# Patient Record
Sex: FEMALE | Race: WHITE | Employment: UNEMPLOYED | URBAN - METROPOLITAN AREA
[De-identification: names, ages, dates, MRNs, and addresses within clinical notes are randomized per-mention and may not be internally consistent; named-entity substitution may affect disease eponyms.]

---

## 2023-08-14 ENCOUNTER — HOSPITAL ENCOUNTER (EMERGENCY)
Age: 28
Discharge: HOME | End: 2023-08-14
Attending: INTERNAL MEDICINE
Payer: COMMERCIAL

## 2023-08-14 ENCOUNTER — APPOINTMENT (OUTPATIENT)
Dept: CT IMAGING | Age: 28
End: 2023-08-14
Payer: COMMERCIAL

## 2023-08-14 VITALS
HEIGHT: 64 IN | BODY MASS INDEX: 43.71 KG/M2 | HEART RATE: 73 BPM | WEIGHT: 256 LBS | OXYGEN SATURATION: 98 % | RESPIRATION RATE: 26 BRPM | TEMPERATURE: 97.7 F | DIASTOLIC BLOOD PRESSURE: 87 MMHG | SYSTOLIC BLOOD PRESSURE: 142 MMHG

## 2023-08-14 DIAGNOSIS — R10.13 ABDOMINAL PAIN, EPIGASTRIC: Primary | ICD-10-CM

## 2023-08-14 LAB
ALBUMIN SERPL-MCNC: 5 G/DL (ref 3.4–5)
ALP SERPL-CCNC: 158 U/L (ref 40–129)
ALT SERPL-CCNC: 30 U/L (ref 10–40)
ANION GAP SERPL CALCULATED.3IONS-SCNC: 11 MMOL/L (ref 3–16)
AST SERPL-CCNC: 16 U/L (ref 15–37)
BACTERIA URNS QL MICRO: ABNORMAL /HPF
BASOPHILS # BLD: 0.1 K/UL (ref 0–0.2)
BASOPHILS NFR BLD: 0.8 %
BILIRUB DIRECT SERPL-MCNC: <0.2 MG/DL (ref 0–0.3)
BILIRUB INDIRECT SERPL-MCNC: ABNORMAL MG/DL (ref 0–1)
BILIRUB SERPL-MCNC: <0.2 MG/DL (ref 0–1)
BILIRUB UR QL STRIP.AUTO: NEGATIVE
BUN SERPL-MCNC: 19 MG/DL (ref 7–20)
CALCIUM SERPL-MCNC: 10.7 MG/DL (ref 8.3–10.6)
CHLORIDE SERPL-SCNC: 108 MMOL/L (ref 99–110)
CLARITY UR: CLEAR
CO2 SERPL-SCNC: 24 MMOL/L (ref 21–32)
COLOR UR: YELLOW
CREAT SERPL-MCNC: 0.7 MG/DL (ref 0.6–1.1)
DEPRECATED RDW RBC AUTO: 15.4 % (ref 12.4–15.4)
EOSINOPHIL # BLD: 0.1 K/UL (ref 0–0.6)
EOSINOPHIL NFR BLD: 1.2 %
EPI CELLS #/AREA URNS AUTO: 5 /HPF (ref 0–5)
GFR SERPLBLD CREATININE-BSD FMLA CKD-EPI: >60 ML/MIN/{1.73_M2}
GLUCOSE SERPL-MCNC: 125 MG/DL (ref 70–99)
GLUCOSE UR STRIP.AUTO-MCNC: NEGATIVE MG/DL
HCG UR QL: NEGATIVE
HCT VFR BLD AUTO: 36.9 % (ref 36–48)
HGB BLD-MCNC: 11.9 G/DL (ref 12–16)
HGB UR QL STRIP.AUTO: NEGATIVE
HYALINE CASTS #/AREA URNS AUTO: 0 /LPF (ref 0–8)
KETONES UR STRIP.AUTO-MCNC: NEGATIVE MG/DL
LEUKOCYTE ESTERASE UR QL STRIP.AUTO: ABNORMAL
LIPASE SERPL-CCNC: 28 U/L (ref 13–60)
LYMPHOCYTES # BLD: 1.5 K/UL (ref 1–5.1)
LYMPHOCYTES NFR BLD: 15.3 %
MCH RBC QN AUTO: 23.6 PG (ref 26–34)
MCHC RBC AUTO-ENTMCNC: 32.3 G/DL (ref 31–36)
MCV RBC AUTO: 73.1 FL (ref 80–100)
MONOCYTES # BLD: 0.5 K/UL (ref 0–1.3)
MONOCYTES NFR BLD: 5.2 %
NEUTROPHILS # BLD: 7.5 K/UL (ref 1.7–7.7)
NEUTROPHILS NFR BLD: 77.5 %
NITRITE UR QL STRIP.AUTO: NEGATIVE
PH UR STRIP.AUTO: 6.5 [PH] (ref 5–8)
PLATELET # BLD AUTO: 401 K/UL (ref 135–450)
PMV BLD AUTO: 7.9 FL (ref 5–10.5)
POTASSIUM SERPL-SCNC: 4.4 MMOL/L (ref 3.5–5.1)
PROT SERPL-MCNC: 8 G/DL (ref 6.4–8.2)
PROT UR STRIP.AUTO-MCNC: NEGATIVE MG/DL
RBC # BLD AUTO: 5.04 M/UL (ref 4–5.2)
RBC CLUMPS #/AREA URNS AUTO: 1 /HPF (ref 0–4)
SODIUM SERPL-SCNC: 143 MMOL/L (ref 136–145)
SP GR UR STRIP.AUTO: 1.02 (ref 1–1.03)
UA COMPLETE W REFLEX CULTURE PNL UR: ABNORMAL
UA DIPSTICK W REFLEX MICRO PNL UR: YES
URN SPEC COLLECT METH UR: ABNORMAL
UROBILINOGEN UR STRIP-ACNC: 0.2 E.U./DL
WBC # BLD AUTO: 9.7 K/UL (ref 4–11)
WBC #/AREA URNS AUTO: 1 /HPF (ref 0–5)

## 2023-08-14 PROCEDURE — 81001 URINALYSIS AUTO W/SCOPE: CPT

## 2023-08-14 PROCEDURE — 6360000004 HC RX CONTRAST MEDICATION: Performed by: STUDENT IN AN ORGANIZED HEALTH CARE EDUCATION/TRAINING PROGRAM

## 2023-08-14 PROCEDURE — 36415 COLL VENOUS BLD VENIPUNCTURE: CPT

## 2023-08-14 PROCEDURE — 85025 COMPLETE CBC W/AUTO DIFF WBC: CPT

## 2023-08-14 PROCEDURE — 84703 CHORIONIC GONADOTROPIN ASSAY: CPT

## 2023-08-14 PROCEDURE — 74177 CT ABD & PELVIS W/CONTRAST: CPT

## 2023-08-14 PROCEDURE — 99285 EMERGENCY DEPT VISIT HI MDM: CPT

## 2023-08-14 PROCEDURE — 83690 ASSAY OF LIPASE: CPT

## 2023-08-14 PROCEDURE — 6370000000 HC RX 637 (ALT 250 FOR IP): Performed by: INTERNAL MEDICINE

## 2023-08-14 PROCEDURE — 80048 BASIC METABOLIC PNL TOTAL CA: CPT

## 2023-08-14 PROCEDURE — 80076 HEPATIC FUNCTION PANEL: CPT

## 2023-08-14 PROCEDURE — 2580000003 HC RX 258: Performed by: INTERNAL MEDICINE

## 2023-08-14 RX ORDER — SIMETHICONE 40MG/0.6ML
10 SUSPENSION, DROPS(FINAL DOSAGE FORM)(ML) ORAL 2 TIMES DAILY
Qty: 200 ML | Refills: 0 | Status: SHIPPED | OUTPATIENT
Start: 2023-08-14

## 2023-08-14 RX ORDER — 0.9 % SODIUM CHLORIDE 0.9 %
500 INTRAVENOUS SOLUTION INTRAVENOUS ONCE
Status: COMPLETED | OUTPATIENT
Start: 2023-08-14 | End: 2023-08-14

## 2023-08-14 RX ADMIN — SODIUM CHLORIDE 500 ML: 9 INJECTION, SOLUTION INTRAVENOUS at 05:58

## 2023-08-14 RX ADMIN — ALUMINUM HYDROXIDE, MAGNESIUM HYDROXIDE, AND SIMETHICONE: 200; 200; 20 SUSPENSION ORAL at 05:57

## 2023-08-14 RX ADMIN — IOPAMIDOL 75 ML: 755 INJECTION, SOLUTION INTRAVENOUS at 06:41

## 2023-08-14 ASSESSMENT — PAIN DESCRIPTION - FREQUENCY: FREQUENCY: CONTINUOUS

## 2023-08-14 ASSESSMENT — PAIN DESCRIPTION - ORIENTATION: ORIENTATION: MID;UPPER

## 2023-08-14 ASSESSMENT — PAIN DESCRIPTION - LOCATION: LOCATION: ABDOMEN

## 2023-08-14 ASSESSMENT — PAIN - FUNCTIONAL ASSESSMENT: PAIN_FUNCTIONAL_ASSESSMENT: 0-10

## 2023-08-14 ASSESSMENT — LIFESTYLE VARIABLES
HOW MANY STANDARD DRINKS CONTAINING ALCOHOL DO YOU HAVE ON A TYPICAL DAY: PATIENT DOES NOT DRINK
HOW OFTEN DO YOU HAVE A DRINK CONTAINING ALCOHOL: NEVER

## 2023-08-14 ASSESSMENT — PAIN SCALES - GENERAL: PAINLEVEL_OUTOF10: 10

## 2023-08-14 ASSESSMENT — PAIN DESCRIPTION - PAIN TYPE: TYPE: ACUTE PAIN

## 2023-08-14 NOTE — ED PROVIDER NOTES
EMERGENCY MEDICINE PROVIDER NOTE    Patient Identification  Pt Name: Mary Ann Gallagher  MRN: [id number]  [address] 1995  Date of evaluation: 8/14/2023  Provider: Yahaira Smith DO  PCP: No primary care provider on file. Chief Complaint  Abdominal Pain (From home. C/O upper mid abdominal pain, vomiting since 2200. States she had blood work done recently which showed an elevated alk phos. PT diaphoretic in triage. 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 Tried omeprazole, cinnamon, emilee w/o relief. Currently breastfeeding.)      HPI  (History provided by patient)  This is a 33 y.o. female who was brought in by self for epigastric abdominal pain that started at 10 PM yesterday. Patient has a history of a liver tumor and recently had blood work that showed an elevated alkaline phosphatase per the family. Family states that she is scheduled to have an MRI done today to evaluate her abdomen but I do not see any order in care everywhere or in [facility] for an MRI. Patient denies nausea and vomiting. She tried omeprazole, cinnamon and emilee without relief. She is currently breast-feeding. The pain is severe. I have reviewed the following nursing documentation:  Allergies: Patient has no known allergies. Past medical history:   Past Medical History:   Diagnosis Date    Liver tumor      Past surgical history:   Past Surgical History:   Procedure Laterality Date    ANKLE SURGERY      KNEE SURGERY      LIVER RESECTION      SINUS SURGERY      TONSILLECTOMY         Home medications:   Discharge Medication List as of 8/14/2023  7:27 AM        CONTINUE these medications which have NOT CHANGED    Details   Prenatal Vit-Fe Fumarate-FA (PRENATAL VITAMINS PO) Take by mouthHistorical Med      Citalopram Hydrobromide (CELEXA PO) Take by mouthHistorical Med             Social history:  reports that she has never smoked. She has never used smokeless tobacco. She reports that she does not currently use alcohol.  She

## 2023-08-14 NOTE — ED PROVIDER NOTES
Emergency Department Encounter  Location: [address] [place] [address]    Patient: Nathalia Mcgraw  MRN: [id number]  : 1995  Date of evaluation: 2023  ED Provider: Alia Rider MD    Nathalia Mcgraw was checked out to me by Dr. Iker Myers. Please see his/her initial documentation for details of the patient's initial ED presentation, physical exam and completed studies. In brief, Nathalia Mcgraw is a 33 y.o. female that presented to the emergency department with emesis and upper abd pain. She is breastfeeding. Hx of hepatic tumor surgically removed. Omeprazole not working.      I have reviewed and interpreted all of the currently available lab results and diagnostics from this visit:    Labs  Results for orders placed or performed during the hospital encounter of 23   CBC with Auto Differential   Result Value Ref Range    WBC 9.7 4.0 - 11.0 K/uL    RBC 5.04 4.00 - 5.20 M/uL    Hemoglobin 11.9 (L) 12.0 - 16.0 g/dL    Hematocrit 36.9 36.0 - 48.0 %    MCV 73.1 (L) 80.0 - 100.0 fL    MCH 23.6 (L) 26.0 - 34.0 pg    MCHC 32.3 31.0 - 36.0 g/dL    RDW 15.4 12.4 - 15.4 %    Platelets 405 076 - 755 K/uL    MPV 7.9 5.0 - 10.5 fL    Neutrophils % 77.5 %    Lymphocytes % 15.3 %    Monocytes % 5.2 %    Eosinophils % 1.2 %    Basophils % 0.8 %    Neutrophils Absolute 7.5 1.7 - 7.7 K/uL    Lymphocytes Absolute 1.5 1.0 - 5.1 K/uL    Monocytes Absolute 0.5 0.0 - 1.3 K/uL    Eosinophils Absolute 0.1 0.0 - 0.6 K/uL    Basophils Absolute 0.1 0.0 - 0.2 K/uL   Basic Metabolic Panel   Result Value Ref Range    Sodium 143 136 - 145 mmol/L    Potassium 4.4 3.5 - 5.1 mmol/L    Chloride 108 99 - 110 mmol/L    CO2 24 21 - 32 mmol/L    Anion Gap 11 3 - 16    Glucose 125 (H) 70 - 99 mg/dL    BUN 19 7 - 20 mg/dL    Creatinine 0.7 0.6 - 1.1 mg/dL    Est, Glom Filt Rate >60 >60    Calcium 10.7 (H) 8.3 - 10.6 mg/dL   Hepatic Function Panel   Result Value Ref Range    Total Protein 8.0 6.4 -

## 2023-09-04 ENCOUNTER — HOSPITAL ENCOUNTER (EMERGENCY)
Age: 28
Discharge: HOME | End: 2023-09-04
Payer: COMMERCIAL

## 2023-09-04 VITALS
SYSTOLIC BLOOD PRESSURE: 120 MMHG | RESPIRATION RATE: 16 BRPM | OXYGEN SATURATION: 97 % | DIASTOLIC BLOOD PRESSURE: 71 MMHG | BODY MASS INDEX: 43.07 KG/M2 | WEIGHT: 252.3 LBS | TEMPERATURE: 98.2 F | HEART RATE: 82 BPM | HEIGHT: 64 IN

## 2023-09-04 DIAGNOSIS — R10.13 ABDOMINAL PAIN, EPIGASTRIC: Primary | ICD-10-CM

## 2023-09-04 LAB
ALBUMIN SERPL-MCNC: 4.2 G/DL (ref 3.4–5)
ALBUMIN/GLOB SERPL: 1.2 {RATIO} (ref 1.1–2.2)
ALP SERPL-CCNC: 139 U/L (ref 40–129)
ALT SERPL-CCNC: 35 U/L (ref 10–40)
ANION GAP SERPL CALCULATED.3IONS-SCNC: 13 MMOL/L (ref 3–16)
AST SERPL-CCNC: 22 U/L (ref 15–37)
BASOPHILS # BLD: 0.1 K/UL (ref 0–0.2)
BASOPHILS NFR BLD: 1.3 %
BILIRUB SERPL-MCNC: <0.2 MG/DL (ref 0–1)
BUN SERPL-MCNC: 18 MG/DL (ref 7–20)
CALCIUM SERPL-MCNC: 10.9 MG/DL (ref 8.3–10.6)
CHLORIDE SERPL-SCNC: 106 MMOL/L (ref 99–110)
CO2 SERPL-SCNC: 20 MMOL/L (ref 21–32)
CREAT SERPL-MCNC: <0.5 MG/DL (ref 0.6–1.1)
DEPRECATED RDW RBC AUTO: 15.8 % (ref 12.4–15.4)
EOSINOPHIL # BLD: 0.1 K/UL (ref 0–0.6)
EOSINOPHIL NFR BLD: 1.5 %
GFR SERPLBLD CREATININE-BSD FMLA CKD-EPI: >60 ML/MIN/{1.73_M2}
GLUCOSE SERPL-MCNC: 118 MG/DL (ref 70–99)
HCG SERPL QL: NEGATIVE
HCT VFR BLD AUTO: 37 % (ref 36–48)
HGB BLD-MCNC: 12.2 G/DL (ref 12–16)
LIPASE SERPL-CCNC: 26 U/L (ref 13–60)
LYMPHOCYTES # BLD: 2.4 K/UL (ref 1–5.1)
LYMPHOCYTES NFR BLD: 30.8 %
MCH RBC QN AUTO: 23.6 PG (ref 26–34)
MCHC RBC AUTO-ENTMCNC: 32.9 G/DL (ref 31–36)
MCV RBC AUTO: 71.9 FL (ref 80–100)
MONOCYTES # BLD: 0.6 K/UL (ref 0–1.3)
MONOCYTES NFR BLD: 7.9 %
NEUTROPHILS # BLD: 4.6 K/UL (ref 1.7–7.7)
NEUTROPHILS NFR BLD: 58.5 %
PLATELET # BLD AUTO: 382 K/UL (ref 135–450)
PMV BLD AUTO: 7.9 FL (ref 5–10.5)
POTASSIUM SERPL-SCNC: 4.3 MMOL/L (ref 3.5–5.1)
PROT SERPL-MCNC: 7.7 G/DL (ref 6.4–8.2)
RBC # BLD AUTO: 5.15 M/UL (ref 4–5.2)
SODIUM SERPL-SCNC: 139 MMOL/L (ref 136–145)
WBC # BLD AUTO: 7.8 K/UL (ref 4–11)

## 2023-09-04 PROCEDURE — 99283 EMERGENCY DEPT VISIT LOW MDM: CPT

## 2023-09-04 PROCEDURE — 83690 ASSAY OF LIPASE: CPT

## 2023-09-04 PROCEDURE — 80053 COMPREHEN METABOLIC PANEL: CPT

## 2023-09-04 PROCEDURE — 6370000000 HC RX 637 (ALT 250 FOR IP): Performed by: NURSE PRACTITIONER

## 2023-09-04 PROCEDURE — 85025 COMPLETE CBC W/AUTO DIFF WBC: CPT

## 2023-09-04 PROCEDURE — 84703 CHORIONIC GONADOTROPIN ASSAY: CPT

## 2023-09-04 RX ORDER — OMEPRAZOLE 20 MG/1
20 CAPSULE, DELAYED RELEASE ORAL
Qty: 30 CAPSULE | Refills: 0 | Status: SHIPPED | OUTPATIENT
Start: 2023-09-04

## 2023-09-04 RX ADMIN — ALUMINUM HYDROXIDE, MAGNESIUM HYDROXIDE, AND SIMETHICONE: 200; 200; 20 SUSPENSION ORAL at 02:24

## 2023-09-04 ASSESSMENT — ENCOUNTER SYMPTOMS
DIARRHEA: 0
ABDOMINAL PAIN: 1
VOMITING: 1
NAUSEA: 1
SHORTNESS OF BREATH: 0
CHEST TIGHTNESS: 0

## 2023-09-04 ASSESSMENT — PAIN - FUNCTIONAL ASSESSMENT: PAIN_FUNCTIONAL_ASSESSMENT: 0-10

## 2023-09-04 ASSESSMENT — PAIN DESCRIPTION - PAIN TYPE: TYPE: ACUTE PAIN

## 2023-09-04 ASSESSMENT — PAIN DESCRIPTION - LOCATION: LOCATION: ABDOMEN

## 2023-09-04 ASSESSMENT — PAIN DESCRIPTION - ORIENTATION: ORIENTATION: UPPER;MID

## 2023-09-04 ASSESSMENT — PAIN SCALES - GENERAL: PAINLEVEL_OUTOF10: 8

## 2023-09-04 NOTE — ED PROVIDER NOTES
acute pancreatitis, intra-abdominal abscess, perforated viscus, diverticulitis, cholecystitis, appendicitis, PID, ovarian torsion, ectopic pregnancy and tubo-ovarian abscess is very low. There is no evidence of peritonitis, sepsis or toxicity at this time. I feel the patient can be managed as an outpatient with follow-up with her family doctor in 24-48 hours. Instructions have been given for the patient to return to the ED for worsening of the pain, high fevers, intractable vomiting, or bleeding. Disposition Considerations (include 1 Tests not done, Shared Decision Making, Pt Expectation of Test or Tx.): Shared decision making used throughout. I did consider CT scan, the patient did have a normal CT scan 2 weeks ago and symptoms are present similarly    Appropriate for outpatient management follow-up with primary care      I am the Primary Clinician of Record. FINAL IMPRESSION      1.  Abdominal pain, epigastric          DISPOSITION/PLAN     DISPOSITION Decision To Discharge 09/04/2023 02:26:22 AM      PATIENT REFERRED TO:  Mine Duran, [address] [facility]  [phone]    Schedule an appointment as soon as possible for a visit       Jake Kumar, [address] [facility], [address] [place] [phone]    Schedule an appointment as soon as possible for a visit   gi specialist- if pain continues, please schedule an appointment      DISCHARGE MEDICATIONS:  Discharge Medication List as of 9/4/2023  3:03 AM        START taking these medications    Details   omeprazole (PRILOSEC) 20 MG delayed release capsule Take 1 capsule by mouth every morning (before breakfast), Disp-30 capsule, R-0Normal             DISCONTINUED MEDICATIONS:  Discharge Medication List as of 9/4/2023  3:03 AM                 (Please note that portions of this note were completed with a voice recognition program.  Efforts were made to edit the dictations but occasionally words are

## 2023-12-04 ENCOUNTER — HOSPITAL ENCOUNTER (EMERGENCY)
Age: 28
Discharge: LEFT BEFORE DISCHARGE | End: 2023-12-04
Payer: COMMERCIAL

## 2023-12-04 ENCOUNTER — APPOINTMENT (OUTPATIENT)
Dept: CT IMAGING | Age: 28
End: 2023-12-04
Payer: COMMERCIAL

## 2023-12-04 VITALS
TEMPERATURE: 98.2 F | WEIGHT: 255 LBS | HEIGHT: 64 IN | RESPIRATION RATE: 20 BRPM | HEART RATE: 86 BPM | DIASTOLIC BLOOD PRESSURE: 83 MMHG | BODY MASS INDEX: 43.54 KG/M2 | OXYGEN SATURATION: 96 % | SYSTOLIC BLOOD PRESSURE: 129 MMHG

## 2023-12-04 DIAGNOSIS — R10.13 ABDOMINAL PAIN, EPIGASTRIC: Primary | ICD-10-CM

## 2023-12-04 LAB
ALBUMIN SERPL-MCNC: 4.9 G/DL (ref 3.4–5)
ALBUMIN/GLOB SERPL: 2.1 {RATIO} (ref 1.1–2.2)
ALP SERPL-CCNC: 142 U/L (ref 40–129)
ALT SERPL-CCNC: 17 U/L (ref 10–40)
ANION GAP SERPL CALCULATED.3IONS-SCNC: 9 MMOL/L (ref 3–16)
AST SERPL-CCNC: 12 U/L (ref 15–37)
BACTERIA URNS QL MICRO: ABNORMAL /HPF
BASOPHILS # BLD: 0 K/UL (ref 0–0.2)
BASOPHILS NFR BLD: 0.5 %
BILIRUB SERPL-MCNC: 0.5 MG/DL (ref 0–1)
BILIRUB UR QL STRIP.AUTO: NEGATIVE
BUN SERPL-MCNC: 9 MG/DL (ref 7–20)
CALCIUM SERPL-MCNC: 9.7 MG/DL (ref 8.3–10.6)
CHLORIDE SERPL-SCNC: 104 MMOL/L (ref 99–110)
CLARITY UR: ABNORMAL
CO2 SERPL-SCNC: 27 MMOL/L (ref 21–32)
COLOR UR: ABNORMAL
CREAT SERPL-MCNC: 0.7 MG/DL (ref 0.6–1.1)
DEPRECATED RDW RBC AUTO: 17.3 % (ref 12.4–15.4)
EOSINOPHIL # BLD: 0.2 K/UL (ref 0–0.6)
EOSINOPHIL NFR BLD: 2.9 %
EPI CELLS #/AREA URNS AUTO: 26 /HPF (ref 0–5)
GFR SERPLBLD CREATININE-BSD FMLA CKD-EPI: >60 ML/MIN/{1.73_M2}
GLUCOSE SERPL-MCNC: 94 MG/DL (ref 70–99)
GLUCOSE UR STRIP.AUTO-MCNC: NEGATIVE MG/DL
HCG SERPL QL: NEGATIVE
HCT VFR BLD AUTO: 40.7 % (ref 36–48)
HGB BLD-MCNC: 13.5 G/DL (ref 12–16)
HGB UR QL STRIP.AUTO: NEGATIVE
HYALINE CASTS #/AREA URNS AUTO: 0 /LPF (ref 0–8)
KETONES UR STRIP.AUTO-MCNC: NEGATIVE MG/DL
LEUKOCYTE ESTERASE UR QL STRIP.AUTO: NEGATIVE
LIPASE SERPL-CCNC: 29 U/L (ref 13–60)
LYMPHOCYTES # BLD: 1.5 K/UL (ref 1–5.1)
LYMPHOCYTES NFR BLD: 19 %
MCH RBC QN AUTO: 26.5 PG (ref 26–34)
MCHC RBC AUTO-ENTMCNC: 33.3 G/DL (ref 31–36)
MCV RBC AUTO: 79.6 FL (ref 80–100)
MONOCYTES # BLD: 0.5 K/UL (ref 0–1.3)
MONOCYTES NFR BLD: 5.9 %
NEUTROPHILS # BLD: 5.8 K/UL (ref 1.7–7.7)
NEUTROPHILS NFR BLD: 71.7 %
NITRITE UR QL STRIP.AUTO: NEGATIVE
PH UR STRIP.AUTO: 6 [PH] (ref 5–8)
PLATELET # BLD AUTO: 358 K/UL (ref 135–450)
PMV BLD AUTO: 7.6 FL (ref 5–10.5)
POTASSIUM SERPL-SCNC: 3.9 MMOL/L (ref 3.5–5.1)
PROT SERPL-MCNC: 7.2 G/DL (ref 6.4–8.2)
PROT UR STRIP.AUTO-MCNC: ABNORMAL MG/DL
RBC # BLD AUTO: 5.12 M/UL (ref 4–5.2)
RBC CLUMPS #/AREA URNS AUTO: 3 /HPF (ref 0–4)
SODIUM SERPL-SCNC: 140 MMOL/L (ref 136–145)
SP GR UR STRIP.AUTO: 1.02 (ref 1–1.03)
UA COMPLETE W REFLEX CULTURE PNL UR: ABNORMAL
UA DIPSTICK W REFLEX MICRO PNL UR: YES
URN SPEC COLLECT METH UR: ABNORMAL
UROBILINOGEN UR STRIP-ACNC: 1 E.U./DL
WBC # BLD AUTO: 8.2 K/UL (ref 4–11)
WBC #/AREA URNS AUTO: 4 /HPF (ref 0–5)

## 2023-12-04 PROCEDURE — 85025 COMPLETE CBC W/AUTO DIFF WBC: CPT

## 2023-12-04 PROCEDURE — 99283 EMERGENCY DEPT VISIT LOW MDM: CPT

## 2023-12-04 PROCEDURE — 80053 COMPREHEN METABOLIC PANEL: CPT

## 2023-12-04 PROCEDURE — 84703 CHORIONIC GONADOTROPIN ASSAY: CPT

## 2023-12-04 PROCEDURE — 81001 URINALYSIS AUTO W/SCOPE: CPT

## 2023-12-04 PROCEDURE — 83690 ASSAY OF LIPASE: CPT

## 2023-12-04 RX ORDER — BUSPIRONE HYDROCHLORIDE 10 MG/1
10 TABLET ORAL 2 TIMES DAILY
COMMUNITY

## 2023-12-04 RX ORDER — FERROUS SULFATE 325(65) MG
325 TABLET ORAL EVERY OTHER DAY
COMMUNITY

## 2023-12-04 ASSESSMENT — PAIN DESCRIPTION - PAIN TYPE: TYPE: ACUTE PAIN

## 2023-12-04 ASSESSMENT — ENCOUNTER SYMPTOMS
SHORTNESS OF BREATH: 0
DIARRHEA: 1
COLOR CHANGE: 0
WHEEZING: 0
CONSTIPATION: 0
STRIDOR: 0
BACK PAIN: 0
NAUSEA: 1
COUGH: 0
VOMITING: 1
ABDOMINAL PAIN: 1

## 2023-12-04 ASSESSMENT — PAIN DESCRIPTION - LOCATION: LOCATION: ABDOMEN

## 2023-12-04 ASSESSMENT — PAIN DESCRIPTION - ORIENTATION: ORIENTATION: RIGHT;MID

## 2023-12-04 ASSESSMENT — LIFESTYLE VARIABLES: HOW OFTEN DO YOU HAVE A DRINK CONTAINING ALCOHOL: MONTHLY OR LESS

## 2023-12-04 ASSESSMENT — PAIN - FUNCTIONAL ASSESSMENT: PAIN_FUNCTIONAL_ASSESSMENT: 0-10

## 2023-12-04 NOTE — ED TRIAGE NOTES
[facility]        Pt Name: Jere Villarreal  MRN: [id number]  [address] 1995  Date of evaluation: 12/4/2023  Provider: Bruno Richardson PA-C  PCP: Harley Burden MD  Note Started: 11:50 AM EST 12/4/23      {Shared Not Shared:59920}      [address]       Chief Complaint   Patient presents with    Abdominal Pain     Arrived per self d/t abd pain, emesis and diarrhea that started yesterday; woke up this morning with a rash to face; abnormal labs drawn on Friday with elevated B12 and thyroid       HISTORY OF PRESENT ILLNESS: 1 or more Elements     {History from (Optional):02704}    {Limitations to history (Optional):95837}    Jere Villarreal is a 33 y.o. female who presents ***    Nursing Notes were all reviewed and agreed with or any disagreements were addressed in the HPI. REVIEW OF SYSTEMS :      Review of Systems    Positives and Pertinent negatives as per HPI. SURGICAL HISTORY     Past Surgical History:   Procedure Laterality Date    ANKLE SURGERY      KNEE SURGERY      LIVER RESECTION      SINUS SURGERY      TONSILLECTOMY         CURRENTMEDICATIONS       Previous Medications    BUSPIRONE (BUSPAR) 10 MG TABLET    Take 1 tablet by mouth in the morning and at bedtime    RONI CARB-MAG HYDROX-SIMETH (MYLANTA COAT & COOL) 1200-270-80 MG/10ML SUSP    Take 10 mLs by mouth in the morning and at bedtime    CITALOPRAM HYDROBROMIDE (CELEXA PO)    Take by mouth    FERROUS SULFATE (IRON 325) 325 (65 FE) MG TABLET    Take 1 tablet by mouth every other day    OMEPRAZOLE (PRILOSEC) 20 MG DELAYED RELEASE CAPSULE    Take 1 capsule by mouth every morning (before breakfast)    PRENATAL VIT-FE FUMARATE-FA (PRENATAL VITAMINS PO)    Take by mouth       ALLERGIES     Patient has no known allergies. FAMILYHISTORY     History reviewed. No pertinent family history.      SOCIAL HISTORY       Social History     Tobacco Use

## 2023-12-04 NOTE — ED PROVIDER NOTES
[facility]        Pt Name: Curtis Weaver  MRN: [id number]  [address] 1995  Date of evaluation: 2023  Provider: Tamiko Barnhart PA-C  PCP: Hellen Paredes MD  Note Started: 12:10 PM EST 23      VENECIA. I have evaluated this patient. CHIEF COMPLAINT       Chief Complaint   Patient presents with    Abdominal Pain     Arrived per self d/t abd pain, emesis and diarrhea that started yesterday; woke up this morning with a rash to face; abnormal labs drawn on Friday with elevated B12 and thyroid       HISTORY OF PRESENT ILLNESS: 1 or more Elements     History from : Patient    Limitations to history : None    Curtis Weaver is a 33 y.o. female who presents to the emergency department complaining of upper abdominal pain with nausea, vomiting and diarrhea starting yesterday. She denies ingestion of seafood,  or spicy food prior to onset of symptoms. She denies recent travel, sick exposures or antibiotics. She has had issues with epigastric pain and GERD in the past.  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 This morning she woke up with acne on her right cheek. She had labs drawn on Friday and was told that her B12 and thyroid levels were elevated. However, she recently started taking weekly injections of B12 8 weeks ago because her B12 levels were low at that time. Nursing Notes were all reviewed and agreed with or any disagreements were addressed in the HPI. REVIEW OF SYSTEMS :      Review of Systems   Constitutional:  Negative for chills and fever. HENT: Negative. Eyes:  Negative for visual disturbance. Respiratory:  Negative for cough, shortness of breath, wheezing and stridor. Cardiovascular:  Negative for chest pain, palpitations and leg swelling.    Gastrointestinal:  Positive for abdominal pain,

## 2024-01-01 ASSESSMENT — ENCOUNTER SYMPTOMS
SHORTNESS OF BREATH: 0
VOMITING: 1
NAUSEA: 1
ABDOMINAL PAIN: 1
COLOR CHANGE: 0
CONSTIPATION: 0
BACK PAIN: 0
BLOOD IN STOOL: 0
DIARRHEA: 1

## 2024-10-14 ENCOUNTER — HOSPITAL ENCOUNTER (OUTPATIENT)
Age: 29
Discharge: HOME | End: 2024-10-14
Payer: COMMERCIAL

## 2024-10-14 ENCOUNTER — HOSPITAL ENCOUNTER (OUTPATIENT)
Dept: GENERAL RADIOLOGY | Age: 29
Discharge: HOME | End: 2024-10-14
Payer: COMMERCIAL

## 2024-10-14 DIAGNOSIS — M47.816 LUMBAR SPONDYLOSIS: ICD-10-CM

## 2024-10-14 PROCEDURE — 72110 X-RAY EXAM L-2 SPINE 4/>VWS: CPT

## 2024-12-26 ENCOUNTER — HOSPITAL ENCOUNTER (OUTPATIENT)
Age: 29
Discharge: HOME | End: 2024-12-26
Payer: COMMERCIAL

## 2024-12-26 DIAGNOSIS — M54.16 RADICULOPATHY, LUMBAR REGION: ICD-10-CM

## 2024-12-26 PROCEDURE — 72148 MRI LUMBAR SPINE W/O DYE: CPT

## 2025-02-11 ENCOUNTER — HOSPITAL ENCOUNTER (EMERGENCY)
Age: 30
Discharge: HOME | End: 2025-02-11
Attending: EMERGENCY MEDICINE
Payer: COMMERCIAL

## 2025-02-11 VITALS
RESPIRATION RATE: 18 BRPM | DIASTOLIC BLOOD PRESSURE: 81 MMHG | HEART RATE: 100 BPM | SYSTOLIC BLOOD PRESSURE: 119 MMHG | OXYGEN SATURATION: 94 % | BODY MASS INDEX: 37.59 KG/M2 | HEIGHT: 64 IN | TEMPERATURE: 98.3 F | WEIGHT: 220.2 LBS

## 2025-02-11 DIAGNOSIS — B34.9 ACUTE VIRAL SYNDROME: ICD-10-CM

## 2025-02-11 DIAGNOSIS — J10.1 INFLUENZA A: Primary | ICD-10-CM

## 2025-02-11 LAB
ALBUMIN SERPL-MCNC: 4.6 G/DL (ref 3.4–5)
ALBUMIN/GLOB SERPL: 1.5 {RATIO} (ref 1.1–2.2)
ALP SERPL-CCNC: 84 U/L (ref 40–129)
ALT SERPL-CCNC: 15 U/L (ref 10–40)
ANION GAP SERPL CALCULATED.3IONS-SCNC: 13 MMOL/L (ref 3–16)
AST SERPL-CCNC: 15 U/L (ref 15–37)
BASOPHILS # BLD: 0 K/UL (ref 0–0.2)
BASOPHILS NFR BLD: 0.5 %
BILIRUB SERPL-MCNC: 0.3 MG/DL (ref 0–1)
BUN SERPL-MCNC: 8 MG/DL (ref 7–20)
CALCIUM SERPL-MCNC: 9.4 MG/DL (ref 8.3–10.6)
CHLORIDE SERPL-SCNC: 106 MMOL/L (ref 99–110)
CK SERPL-CCNC: 89 U/L (ref 26–192)
CO2 SERPL-SCNC: 20 MMOL/L (ref 21–32)
CREAT SERPL-MCNC: 0.6 MG/DL (ref 0.6–1.1)
DEPRECATED RDW RBC AUTO: 14 % (ref 12.4–15.4)
EKG ATRIAL RATE: 116 BPM
EKG DIAGNOSIS: NORMAL
EKG P AXIS: 25 DEGREES
EKG P-R INTERVAL: 170 MS
EKG Q-T INTERVAL: 314 MS
EKG QRS DURATION: 90 MS
EKG QTC CALCULATION (BAZETT): 436 MS
EKG R AXIS: 10 DEGREES
EKG T AXIS: 4 DEGREES
EKG VENTRICULAR RATE: 116 BPM
EOSINOPHIL # BLD: 0 K/UL (ref 0–0.6)
EOSINOPHIL NFR BLD: 0 %
FLUAV RNA RESP QL NAA+PROBE: DETECTED
FLUBV RNA RESP QL NAA+PROBE: NOT DETECTED
GFR SERPLBLD CREATININE-BSD FMLA CKD-EPI: >90 ML/MIN/{1.73_M2}
GLUCOSE SERPL-MCNC: 98 MG/DL (ref 70–99)
HCT VFR BLD AUTO: 38.2 % (ref 36–48)
HGB BLD-MCNC: 12.9 G/DL (ref 12–16)
LYMPHOCYTES # BLD: 0.2 K/UL (ref 1–5.1)
LYMPHOCYTES NFR BLD: 2.5 %
MCH RBC QN AUTO: 26 PG (ref 26–34)
MCHC RBC AUTO-ENTMCNC: 33.9 G/DL (ref 31–36)
MCV RBC AUTO: 76.8 FL (ref 80–100)
MONOCYTES # BLD: 0.4 K/UL (ref 0–1.3)
MONOCYTES NFR BLD: 5.6 %
NEUTROPHILS # BLD: 7 K/UL (ref 1.7–7.7)
NEUTROPHILS NFR BLD: 91.4 %
PLATELET # BLD AUTO: 297 K/UL (ref 135–450)
PMV BLD AUTO: 7.9 FL (ref 5–10.5)
POTASSIUM SERPL-SCNC: 4 MMOL/L (ref 3.5–5.1)
PROT SERPL-MCNC: 7.7 G/DL (ref 6.4–8.2)
RBC # BLD AUTO: 4.97 M/UL (ref 4–5.2)
SARS-COV-2 RNA RESP QL NAA+PROBE: NOT DETECTED
SODIUM SERPL-SCNC: 139 MMOL/L (ref 136–145)
WBC # BLD AUTO: 7.6 K/UL (ref 4–11)

## 2025-02-11 PROCEDURE — 93010 ELECTROCARDIOGRAM REPORT: CPT | Performed by: INTERNAL MEDICINE

## 2025-02-11 PROCEDURE — 93005 ELECTROCARDIOGRAM TRACING: CPT | Performed by: EMERGENCY MEDICINE

## 2025-02-11 PROCEDURE — 99284 EMERGENCY DEPT VISIT MOD MDM: CPT

## 2025-02-11 PROCEDURE — 85025 COMPLETE CBC W/AUTO DIFF WBC: CPT

## 2025-02-11 PROCEDURE — 96374 THER/PROPH/DIAG INJ IV PUSH: CPT

## 2025-02-11 PROCEDURE — 87636 SARSCOV2 & INF A&B AMP PRB: CPT

## 2025-02-11 PROCEDURE — 80053 COMPREHEN METABOLIC PANEL: CPT

## 2025-02-11 PROCEDURE — 82550 ASSAY OF CK (CPK): CPT

## 2025-02-11 PROCEDURE — 6360000002 HC RX W HCPCS: Performed by: EMERGENCY MEDICINE

## 2025-02-11 RX ORDER — KETOROLAC TROMETHAMINE 30 MG/ML
30 INJECTION, SOLUTION INTRAMUSCULAR; INTRAVENOUS ONCE
Status: COMPLETED | OUTPATIENT
Start: 2025-02-11 | End: 2025-02-11

## 2025-02-11 RX ORDER — IBUPROFEN 200 MG
600 TABLET ORAL EVERY 8 HOURS PRN
Qty: 60 TABLET | Refills: 0 | Status: SHIPPED | OUTPATIENT
Start: 2025-02-11

## 2025-02-11 RX ADMIN — KETOROLAC TROMETHAMINE 30 MG: 30 INJECTION, SOLUTION INTRAMUSCULAR at 12:25

## 2025-02-11 ASSESSMENT — PAIN DESCRIPTION - DESCRIPTORS
DESCRIPTORS: DISCOMFORT
DESCRIPTORS: DISCOMFORT

## 2025-02-11 ASSESSMENT — PAIN SCALES - GENERAL
PAINLEVEL_OUTOF10: 5
PAINLEVEL_OUTOF10: 9
PAINLEVEL_OUTOF10: 0

## 2025-02-11 ASSESSMENT — PAIN - FUNCTIONAL ASSESSMENT: PAIN_FUNCTIONAL_ASSESSMENT: NONE - DENIES PAIN

## 2025-02-11 ASSESSMENT — PAIN DESCRIPTION - LOCATION
LOCATION: GENERALIZED
LOCATION: GENERALIZED

## 2025-02-11 NOTE — ED PROVIDER NOTES
[facility] Emergency Department [facility]    I, Scott Lima MD, am the primary clinician of record.    CHIEF COMPLAINT  Chief Complaint   Patient presents with    Other     Patient states was seen at Urgent Care yesterday and told has bronchitis, states was giving some medicine and wasn't able to sleep last night because legs are burning and aching.      HISTORY OF PRESENT ILLNESS  Kay Lynch is a 29 y.o. female  who presents to the ED complaining of being seen by urgent care yesterday after symptoms started Sunday.  She was diagnosed with bronchitis and tells me that she had a negative flu and COVID swab.  Urgent care prescribed prednisone, albuterol inhaler, Bromfed, and Augmentin.  Since yesterday, last night she had a lot of bodyaches particularly in the legs as well as difficulty sleeping and malaise and fatigue.  She has not measured a definitive fever.  She does have nasal congestion and a mild sore throat and headache nausea and poor appetite.    No other complaints, modifying factors or associated symptoms.     I have reviewed the following from the nursing documentation.    Past Medical History:   Diagnosis Date    Liver tumor      Past Surgical History:   Procedure Laterality Date    ANKLE SURGERY      KNEE SURGERY      LIVER RESECTION      SINUS SURGERY      TONSILLECTOMY       History reviewed. No pertinent family history.  Social History     Socioeconomic History    Marital status:      Spouse name: Not on file    Number of children: Not on file    Years of education: Not on file    Highest education level: Not on file   Occupational History    Not on file   Tobacco Use    Smoking status: Never    Smokeless tobacco: Never   Vaping Use    Vaping status: Never Used   Substance and Sexual Activity    Alcohol use: Not Currently    Drug use: Never    Sexual activity: Not on file   Other Topics Concern    Not on file   Social History Narrative    Not on file     Social

## 2025-04-02 ENCOUNTER — TRANSCRIBE ORDERS (OUTPATIENT)
Dept: ADMINISTRATIVE | Age: 30
End: 2025-04-02

## 2025-04-02 DIAGNOSIS — M54.2 CERVICALGIA: Primary | ICD-10-CM
